# Patient Record
Sex: MALE | Race: WHITE
[De-identification: names, ages, dates, MRNs, and addresses within clinical notes are randomized per-mention and may not be internally consistent; named-entity substitution may affect disease eponyms.]

---

## 2019-02-10 ENCOUNTER — HOSPITAL ENCOUNTER (EMERGENCY)
Dept: HOSPITAL 25 - UCCORT | Age: 54
Discharge: HOME | End: 2019-02-10
Payer: COMMERCIAL

## 2019-02-10 VITALS — SYSTOLIC BLOOD PRESSURE: 140 MMHG | DIASTOLIC BLOOD PRESSURE: 83 MMHG

## 2019-02-10 DIAGNOSIS — J10.1: Primary | ICD-10-CM

## 2019-02-10 DIAGNOSIS — I10: ICD-10-CM

## 2019-02-10 DIAGNOSIS — Z79.82: ICD-10-CM

## 2019-02-10 LAB — FLUAV RNA SPEC QL NAA+PROBE: POSITIVE

## 2019-02-10 PROCEDURE — 99202 OFFICE O/P NEW SF 15 MIN: CPT

## 2019-02-10 PROCEDURE — G0463 HOSPITAL OUTPT CLINIC VISIT: HCPCS

## 2019-02-10 NOTE — UC
FLU HPI





- HPI Summary


HPI Summary: 


JEFF began Friday night---cough, fever, body aches and fatigue---teacher in the 

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








- History of Current Complaint


Chief Complaint: UCRespiratory


Stated Complaint: FLU SYMPTOMS


Time Seen by Provider: 02/10/19 18:14


Hx Obtained From: Patient


Onset/Duration: Sudden Onset, Lasting Days - 2


Severity Currently: Moderate


Severity Initially: Moderate


Pain Intensity: 8


Pain Scale Used: 0-10 Numeric


Associated Signs & Symptoms: Positive: Fever, Myalgia, Cough, Nasal Congestion


Related Hx: Possible Flu/Infectious Exposure





- Allergy/Home Medications


Allergies/Adverse Reactions: 


 Allergies











Allergy/AdvReac Type Severity Reaction Status Date / Time


 


No Known Allergies Allergy   Verified 02/10/19 17:58











Home Medications: 


 Home Medications





Aspirin TAB* [Aspirin 325 MG TAB*] 650 mg PO Q6H PRN 02/10/19 [History 

Confirmed 02/10/19]


Otc Expectorant PRN 02/10/19 [History]











PMH/Surg Hx/FS Hx/Imm Hx


Previously Healthy: No


Cardiovascular History: Hypertension





- Surgical History


Surgical History: None





- Family History


Known Family History: Positive: None





- Social History


Occupation: Employed Full-time


Lives: Alone


Alcohol Use: Daily


Substance Use Type: None


Smoking Status (MU): Never Smoked Tobacco





- Immunization History


Most Recent Tetanus Shot: 04/29/13





Review of Systems


All Other Systems Reviewed And Are Negative: Yes


Constitutional: Positive: Fever, Chills, Fatigue


Skin: Positive: Negative


Eyes: Positive: Negative


ENT: Positive: Ear Ache, Nasal Discharge


Respiratory: Positive: Cough


Cardiovascular: Positive: Negative


Gastrointestinal: Positive: Negative


Genitourinary: Positive: Negative


Motor: Positive: Negative


Neurovascular: Positive: Negative


Musculoskeletal: Positive: Arthralgia, Myalgia


Neurological: Positive: Headache


Psychological: Positive: Negative


Is Patient Immunocompromised?: No





Physical Exam


Triage Information Reviewed: Yes


Appearance: Well-Nourished, Ill-Appearing, Pain Distress


Vital Signs: 


 Initial Vital Signs











Temp  100.6 F   02/10/19 18:02


 


Pulse  101   02/10/19 18:02


 


Resp  18   02/10/19 18:02


 


BP  140/83   02/10/19 18:02


 


Pulse Ox  96   02/10/19 18:02











Vital Signs Reviewed: Yes


Eye Exam: Normal


Eyes: Positive: Conjunctiva Clear


ENT Exam: Normal


ENT: Positive: Normal ENT inspection, Hearing grossly normal, Pharynx normal, 

TMs normal, Uvula midline.  Negative: Nasal congestion, Trismus, Muffled voice, 

Hoarse voice, Dental tenderness, Sinus tenderness


Dental Exam: Normal


Neck exam: Normal


Neck: Positive: Supple, Nontender, No Lymphadenopathy


Respiratory Exam: Normal


Respiratory: Positive: Chest non-tender, Lungs clear, Normal breath sounds, No 

respiratory distress, No accessory muscle use


Cardiovascular Exam: Normal


Cardiovascular: Positive: RRR, No Murmur, Pulses Normal, Brisk Capillary Refill


Musculoskeletal Exam: Normal


Musculoskeletal: Positive: Strength Intact, ROM Intact


Neurological Exam: Normal


Neurological: Positive: Alert, Muscle Tone Normal


Psychological Exam: Normal


Skin Exam: Normal





Flu Course/Dx





- Course


Course Of Treatment: ibuprofen, tylenol, tamiflu increase fluids follow with 

pcp prn, follow blood pressure with pcp





- Differential Dx/Diagnosis


Provider Diagnosis: 


 Influenza A, Hypertension








Discharge





- Sign-Out/Discharge


Documenting (check all that apply): Patient Departure


All imaging exams completed and their final reports reviewed: No Studies





- Discharge Plan


Condition: Stable


Disposition: HOME


Prescriptions: 


Oseltamivir CAP* [Tamiflu CAP*] 75 mg PO BID #9 cap


Patient Education Materials:  Hypertension (ED), Influenza (ED)


Referrals: 


Aravind Walker MD [Primary Care Provider] - 1 Week





- Billing Disposition and Condition


Condition: STABLE


Disposition: Home